# Patient Record
Sex: FEMALE | URBAN - METROPOLITAN AREA
[De-identification: names, ages, dates, MRNs, and addresses within clinical notes are randomized per-mention and may not be internally consistent; named-entity substitution may affect disease eponyms.]

---

## 2022-06-21 ENCOUNTER — APPOINTMENT (RX ONLY)
Dept: URBAN - METROPOLITAN AREA CLINIC 69 | Facility: CLINIC | Age: 57
Setting detail: DERMATOLOGY
End: 2022-06-21

## 2022-06-21 DIAGNOSIS — Z41.9 ENCOUNTER FOR PROCEDURE FOR PURPOSES OTHER THAN REMEDYING HEALTH STATE, UNSPECIFIED: ICD-10-CM

## 2022-06-21 PROCEDURE — ? ADDITIONAL NOTES

## 2022-06-21 PROCEDURE — ? COSMETIC CONSULTATION: DYSPORT

## 2022-06-21 PROCEDURE — ? COSMETIC CONSULTATION: BOTOX

## 2022-06-21 PROCEDURE — ? COSMETIC CONSULTATION: FILLERS

## 2022-06-21 ASSESSMENT — LOCATION DETAILED DESCRIPTION DERM: LOCATION DETAILED: PHILTRUM

## 2022-06-21 ASSESSMENT — LOCATION ZONE DERM: LOCATION ZONE: LIP

## 2022-06-21 ASSESSMENT — LOCATION SIMPLE DESCRIPTION DERM: LOCATION SIMPLE: UPPER LIP

## 2022-06-21 NOTE — PROCEDURE: ADDITIONAL NOTES
Additional Notes: Quoted patient $700 to treat forehead, glabella and around eyes. $560 for forehead and glabella only.  Patient is leaving to go to [place] tomorrow so she postponed treatment until after her trip.  She was told that $100 fee will be applied to her treatment if she schedules to have it done.
Render Risk Assessment In Note?: no
Detail Level: Zone
Additional Notes: Recommend patient to schedule a filler consultation with Dr. Koehler.

## 2022-06-21 NOTE — HPI: COSMETIC (BOTOX)
Have You Had Botox Before?: has not had botox
Additional History: Her son is getting  in September 2022. Has never had botulinum treatment before.